# Patient Record
Sex: FEMALE
[De-identification: names, ages, dates, MRNs, and addresses within clinical notes are randomized per-mention and may not be internally consistent; named-entity substitution may affect disease eponyms.]

---

## 2018-01-12 ENCOUNTER — CHARTING TRANS (OUTPATIENT)
Dept: OTHER | Age: 28
End: 2018-01-12

## 2018-01-29 PROBLEM — IMO0002 CHRONIC MIGRAINE: Status: ACTIVE | Noted: 2018-01-29

## 2018-11-02 VITALS
RESPIRATION RATE: 16 BRPM | HEART RATE: 70 BPM | WEIGHT: 179.99 LBS | TEMPERATURE: 98 F | BODY MASS INDEX: 28.25 KG/M2 | HEIGHT: 67 IN

## 2018-11-14 ENCOUNTER — CHARTING TRANS (OUTPATIENT)
Dept: OTHER | Age: 28
End: 2018-11-14

## 2018-12-07 VITALS
HEIGHT: 67 IN | WEIGHT: 180 LBS | HEART RATE: 60 BPM | RESPIRATION RATE: 16 BRPM | TEMPERATURE: 97.8 F | OXYGEN SATURATION: 99 % | BODY MASS INDEX: 28.25 KG/M2 | SYSTOLIC BLOOD PRESSURE: 122 MMHG | DIASTOLIC BLOOD PRESSURE: 78 MMHG

## 2019-06-17 PROBLEM — F41.9 ANXIETY: Status: ACTIVE | Noted: 2019-06-17

## 2019-06-19 PROCEDURE — 81003 URINALYSIS AUTO W/O SCOPE: CPT | Performed by: FAMILY MEDICINE

## 2019-06-19 PROCEDURE — 86480 TB TEST CELL IMMUN MEASURE: CPT | Performed by: FAMILY MEDICINE

## 2019-08-22 PROBLEM — R63.5 WEIGHT GAIN: Status: ACTIVE | Noted: 2019-08-22

## 2023-12-11 ENCOUNTER — OFFICE VISIT (OUTPATIENT)
Dept: FAMILY MEDICINE CLINIC | Facility: CLINIC | Age: 33
End: 2023-12-11
Payer: COMMERCIAL

## 2023-12-11 VITALS
HEART RATE: 67 BPM | BODY MASS INDEX: 32.18 KG/M2 | SYSTOLIC BLOOD PRESSURE: 114 MMHG | OXYGEN SATURATION: 98 % | RESPIRATION RATE: 16 BRPM | TEMPERATURE: 98 F | WEIGHT: 205 LBS | HEIGHT: 67 IN | DIASTOLIC BLOOD PRESSURE: 76 MMHG

## 2023-12-11 DIAGNOSIS — J01.90 SUBACUTE SINUSITIS, UNSPECIFIED LOCATION: Primary | ICD-10-CM

## 2023-12-11 DIAGNOSIS — R05.8 COUGH PRESENT FOR GREATER THAN 3 WEEKS: ICD-10-CM

## 2023-12-11 RX ORDER — CLOBETASOL PROPIONATE 0.5 MG/G
1 OINTMENT TOPICAL 2 TIMES DAILY
COMMUNITY
Start: 2023-06-20

## 2023-12-11 RX ORDER — DOXYCYCLINE HYCLATE 100 MG/1
100 CAPSULE ORAL 2 TIMES DAILY
Qty: 14 CAPSULE | Refills: 0 | Status: SHIPPED | OUTPATIENT
Start: 2023-12-11 | End: 2023-12-18

## 2023-12-12 NOTE — PATIENT INSTRUCTIONS
PLAN: Doxycycline, take as directed. Finish all the medication even if you feel better. Sit upright for one hour after taking with full glass of water. Consider daily allergy medication such as Allegra, Claritin or Zyrtec for the next week or two to help remove post nasal drainage. Salt water gargles (1 tsp. Salt in 6 oz lukewarm water), use several times daily to help decrease post nasal drip and soothe throat. Saline nasal spray to nostrils if needed to help remove drainage or congestion in nose. Hydrate! (cold or hot based on comfort). Drink lots of water or other non dehydrating liquids to help with illness. Hand washing-use hand  or wash hands frequently, cover your cough or sneeze, do not share towels or drinks with others. Hot steam inhalation, humidifier in the bedroom for sleep. May use Tylenol or Ibuprofen over the counter for pain/comfort if not contraindicated. Follow up in 2 weeks with Dr. Erwin Holguin if not improving,  or sooner if worsening symptoms. Seek immediate care if inability to swallow or breathe.

## 2024-01-17 NOTE — DISCHARGE INSTRUCTIONS
Supportive Care measures   - Alternate ice / heat as tolerated   - Drink plenty of water (approx. 4-6 bottle equivalents per day)   - Naproxen, Ibuprofen, or Tylenol for pain as needed   - Zanaflex for spasms as needed (may make your drowsy)   - You may benefit from over-the-counter topical pain medication such as: Diclofenac (Voltaren), Icy/Hot, Biofreeze, Bengay, etc.   - You may benefit from massage, acupuncture, and/or dry needling   - Avoid excessive twisting / bending / lifting   - Avoid looking down (e.g. texting) as this worsens strain   - Use proper body posture (sitting up straight, shoulder back, etc.)   - You may benefit from using a posture corrector, massage cane, and/to soft cervical collar when at home

## 2024-01-17 NOTE — ED PROVIDER NOTES
History     Chief Complaint   Patient presents with    Neck Pain       Subjective:   HPI    Terra Knox, 33 year old female with notable medical history of chronic migraine, anxiety who presents with Right sided neck pain that radiates to her shoulder. Patient reports s/s stated approx 1.5 weeks ago w/o known injury or precipitating events. Patient did work up the day prior, but denies any events / movements that started her pain. She attempted a friend's Cyclobenzaprine which helped some, but was too sedating. Denies numbness / tingling.    Of note, patient has been seen within the last month a few times for migrainous and sinus complaints, with patient receiving Botox for migraines and Doxy for sinusitis.      Objective:   Past Medical History:   Diagnosis Date    Anxiety     Depression     Migraine               Past Surgical History:   Procedure Laterality Date    CHOLECYSTECTOMY  ' 09    OTHER SURGICAL HISTORY  ' 07    wisdom teeth                Social History     Socioeconomic History    Marital status:    Tobacco Use    Smoking status: Never    Smokeless tobacco: Never   Vaping Use    Vaping Use: Never used   Substance and Sexual Activity    Alcohol use: Yes     Alcohol/week: 0.0 standard drinks of alcohol     Comment: socially    Drug use: No              Review of Systems   Musculoskeletal:  Positive for back pain and neck pain.   All other systems reviewed and are negative.        Constitutional and vital signs reviewed.      All other systems reviewed and negative except as noted above.    I have reviewed the family history, social history, allergies, and outpatient medications.     History reviewed from EMR: Encounters, problem list, allergies, medications      Physical Exam     ED Triage Vitals [01/17/24 0819]   /80   Pulse 64   Resp 18   Temp 98 °F (36.7 °C)   Temp src Temporal   SpO2 100 %   O2 Device None (Room air)       Current:/80   Pulse 64   Temp 98 °F (36.7 °C)  (Temporal)   Resp 18   Ht 170.2 cm (5' 7\")   Wt 90.7 kg   LMP 01/16/2024 (Exact Date)   SpO2 100%   BMI 31.32 kg/m²       Physical Exam  Vitals and nursing note reviewed.   Constitutional:       General: She is not in acute distress.     Appearance: Normal appearance. She is normal weight. She is not ill-appearing or toxic-appearing.   HENT:      Head: Normocephalic and atraumatic.      Right Ear: External ear normal.      Left Ear: External ear normal.      Nose: Nose normal.      Mouth/Throat:      Mouth: Mucous membranes are moist.   Eyes:      Extraocular Movements: Extraocular movements intact.      Conjunctiva/sclera: Conjunctivae normal.      Pupils: Pupils are equal, round, and reactive to light.   Neck:      Comments: Tenderness to Right paraspinal musculature and spasms to Right upper trapezius.  +hunched, rolled shoulder posture  Cardiovascular:      Rate and Rhythm: Normal rate.      Pulses: Normal pulses.   Pulmonary:      Effort: Pulmonary effort is normal. No respiratory distress.   Musculoskeletal:         General: No swelling or signs of injury. Normal range of motion.      Cervical back: Full passive range of motion without pain, normal range of motion and neck supple. Spasms and tenderness present. No torticollis or bony tenderness. Muscular tenderness present. No spinous process tenderness.   Skin:     General: Skin is warm and dry.      Capillary Refill: Capillary refill takes less than 2 seconds.      Coloration: Skin is not jaundiced.   Neurological:      General: No focal deficit present.      Mental Status: She is alert and oriented to person, place, and time. Mental status is at baseline.   Psychiatric:         Mood and Affect: Mood normal.         Behavior: Behavior normal.         Thought Content: Thought content normal.         Judgment: Judgment normal.            ED Course     Labs Reviewed - No data to display  No orders to display       Vitals:    01/17/24 0819   BP: 120/80    Pulse: 64   Resp: 18   Temp: 98 °F (36.7 °C)   TempSrc: Temporal   SpO2: 100%   Weight: 90.7 kg   Height: 170.2 cm (5' 7\")            LYNN        Terra Knox, 33 year old female with medical history as noted above who presents with neck and upper back pain   - Patient in NAD, VSS   - HPI and exam c/w cervical strain and muscle spasms   - Mild massage performed with palpable release of some upper trapezius muscle spasms   - Supportive care discussed   - f/u with care team as needed        ** See ED course for additional information on care provided / interventions / notable events throughout patient's encounter.    ** See below for home care instructions (if applicable)         I have independently reviewed the radiology images, clinical lab results, and ECG tracings as described above (if applicable)        Medical Decision Making  Risk  Prescription drug management.        Disposition and Plan     Clinical Impression:  1. Strain of neck muscle, initial encounter    2. Paraspinal muscle spasm    3. Trapezius muscle spasm         Disposition:  Discharge  1/17/2024  8:34 am    Follow-up:  Douglas Jurado DO  [address] [place] [address] [phone]      As needed    [facility]  [address] [facility] [address] [phone]    Physical Therapy referral (if needed)          Medications Prescribed:  Discharge Medication List as of 1/17/2024  8:37 AM        START taking these medications    Details   naproxen 500 MG Oral Tab Take 1 tablet (500 mg total) by mouth 2 (two) times daily as needed., Normal, Disp-20 tablet, R-0      tiZANidine 4 MG Oral Tab Take 1 tablet (4 mg total) by mouth 2 (two) times daily as needed (spasms)., Normal, Disp-20 tablet, R-0             The above patient (and/or guardian) was made aware that an appropriate evaluation has been performed, and that no additional testing is required at this time. In my medical judgment, there is currently no evidence  of an immediate life-threatening or surgical condition, therefore discharge is indicated at this time. The patient (and/or guardian) was advised that a small risk still exists that a serious condition could develop. The patient was instructed to arrange close follow-up with their primary care provider (or the referral provider given today). The patient received written and verbal instructions regarding their condition / concerns, demonstrated understanding, and is agreement with the outpatient treatment plan.        Home care instructions:    Supportive Care measures   - Alternate ice / heat as tolerated   - Drink plenty of water (approx. 4-6 bottle equivalents per day)   - Naproxen, Ibuprofen, or Tylenol for pain as needed   - Zanaflex for spasms as needed (may make your drowsy)   - You may benefit from over-the-counter topical pain medication such as: Diclofenac (Voltaren), Icy/Hot, Biofreeze, Bengay, etc.   - You may benefit from massage, acupuncture, and/or dry needling   - Avoid excessive twisting / bending / lifting   - Avoid looking down (e.g. texting) as this worsens strain   - Use proper body posture (sitting up straight, shoulder back, etc.)   - You may benefit from using a posture corrector, massage cane, and/to soft cervical collar when at home      Shai Steven, DNP, APRN, AGACNP-BC, FNP-C, CNL  Adult-Gerontology Acute Care & Family Nurse Practitioner  [facility]

## 2024-01-17 NOTE — ED INITIAL ASSESSMENT (HPI)
Patient c/o right sided neck pain that goes to shoulder and clavicle area. Unknown injury. Pain started 1.5 weeks ago.

## 2024-05-13 NOTE — ED INITIAL ASSESSMENT (HPI)
Pt states bumped lt foot on the bed frame yesterday morning.   C/o lt 4th toe pain.  Bruising noted to top of foot.

## 2024-05-13 NOTE — ED PROVIDER NOTES
Patient Seen in: [facility]      History     Chief Complaint   Patient presents with    Foot Pain     Stated Complaint: Lt foot injury    Subjective:   HPI  33-year-old female presents complaining of left fourth toe pain, swelling, and bruising since she hit it on the bed frame.    Objective:   Past Medical History:    Anxiety    Depression    Migraine              Past Surgical History:   Procedure Laterality Date    Cholecystectomy  ' 09    Other surgical history  ' 07    wisdom teeth                Social History     Socioeconomic History    Marital status:    Tobacco Use    Smoking status: Never    Smokeless tobacco: Never   Vaping Use    Vaping status: Never Used   Substance and Sexual Activity    Alcohol use: Yes     Alcohol/week: 0.0 standard drinks of alcohol     Comment: socially    Drug use: No              Review of Systems   All other systems reviewed and are negative.      Positive for stated complaint: Lt foot injury  Other systems are as noted in HPI.  Constitutional and vital signs reviewed.      All other systems reviewed and negative except as noted above.    Physical Exam     ED Triage Vitals [05/13/24 1811]   /72   Pulse 69   Resp 18   Temp 97.1 °F (36.2 °C)   Temp src Temporal   SpO2 99 %   O2 Device None (Room air)       Current Vitals:   Vital Signs  BP: 135/72  Pulse: 69  Resp: 18  Temp: 97.1 °F (36.2 °C)  Temp src: Temporal    Oxygen Therapy  SpO2: 99 %  O2 Device: None (Room air)            Physical Exam  Vitals and nursing note reviewed.   Constitutional:       Appearance: She is well-developed.   Cardiovascular:      Rate and Rhythm: Normal rate.   Pulmonary:      Effort: Pulmonary effort is normal.   Musculoskeletal:      Comments: Left fourth toe with swelling, bruising, and tenderness with limited range of motion.  No metatarsal tenderness.  Pulses intact.   Skin:     General: Skin is warm and dry.   Neurological:      Mental Status: She is alert and  oriented to person, place, and time.               ED Course   Labs Reviewed - No data to display          XR FOOT, COMPLETE (MIN 3 VIEWS), LEFT (CPT=73630)    Result Date: 5/13/2024  PROCEDURE:  XR FOOT, COMPLETE (MIN 3 VIEWS), LEFT (CPT=73630)  TECHNIQUE:  AP, oblique, and lateral views were obtained.  COMPARISON:  None.  INDICATIONS:  Lt foot injury  PATIENT STATED HISTORY: (As transcribed by Technologist)  Patient states injury to left foot after smashing it on bed frame, complains of pain, swelling and bruising to the 4th toe and the area around it.    FINDINGS:  BONES:  Mildly angulated fracture proximal phalange 4th toe is noted. SOFT TISSUES:  Negative.  No visible soft tissue swelling. EFFUSION:  None visible. OTHER:  Negative.            CONCLUSION:  Mildly angulated oblique fracture proximal phalange 4th toe.   LOCATION:  Replaced by [facility]   Dictated by (CST): Sheldon Hylton MD on 5/13/2024 at 7:00 PM     Finalized by (CST): Sheldon Hylton MD on 5/13/2024 at 7:01 PM               MDM     Medical Decision Making  33-year-old female presents complaining of left fourth toe pain, swelling, and bruising since she hit it on the bed frame.    Clinical impression: Closed nondisplaced fracture proximal phalanx of fourth toe    Differential diagnosis: Toe fracture, toe sprain, foot fracture    X-ray shows Mildly angulated oblique fracture proximal phalange 4th toe.  Buddy tape applied to third and fourth toe and patient given a postop shoe with follow-up with podiatry.  I discussed ice, elevation, and Tylenol/Motrin as needed for pain.    Problems Addressed:  Closed nondisplaced fracture of proximal phalanx of lesser toe of left foot, initial encounter: acute illness or injury    Amount and/or Complexity of Data Reviewed  Radiology: ordered and independent interpretation performed.     Details: Fracture proximal phalanx of left fourth toe        Disposition and Plan     Clinical Impression:  1. Closed nondisplaced fracture of  proximal phalanx of lesser toe of left foot, initial encounter         Disposition:  Discharge  5/13/2024  7:15 pm    Follow-up:  Lei Wagner DPM  [address]  [facility] [address] [phone]    Call             Medications Prescribed:  Current Discharge Medication List

## 2024-05-14 NOTE — DISCHARGE INSTRUCTIONS
Use buddy tape and postop shoe until follow-up with orthopedic.  Ice and elevate.  Tylenol and Motrin as needed for pain.

## 2024-07-17 NOTE — H&P
Subjective:   Terra Knox is a 33 year old female  who presents for [facility]     New pt.   She reports being concerned about hormones.   She follows with gyne. She has been trying to conceive for the past year.   Reports that menses is regular; ~23-27 days.   On prenatal vitamins.     Reports difficulty losing weight. Obesity- per charge review she was previously referred to weight loss clinic by another provider.   She also had insulin, tsh and leptin checked in 5/2023 that were unremarkable.   Reports exercising and trying to eat healthy     Migraines- follows with neuro.    Anxiety- controlled. Following with therapy 2x/month     Denies immediate family hx of breast or colon cancer.  Aunt with breast cancer. Per pt not BRCA positive       History/Other:    Chief Complaint Reviewed and Verified  No Further Nursing Notes to   Review  Tobacco Reviewed  Allergies Reviewed  Medications Reviewed    Problem List Reviewed  Medical History Reviewed  Surgical History   Reviewed  OB Status Reviewed  Family History Reviewed  Social History   Reviewed         Current Outpatient Medications   Medication Sig Dispense Refill    clobetasol 0.05 % External Ointment Apply 1 Application topically in the morning and 1 Application before bedtime. Pt stated she takes as needed..      butalbital-acetaminophen-caffeine -40 MG Oral Tab Take 1 tablet by mouth every 6 (six) hours as needed for Headaches. 30 tablet 0       Review of Systems:  Pertinent items are noted in HPI.  A comprehensive 10 point review of systems was completed.  Pertinent positives and negatives noted in the the HPI.        Objective:   /60 (BP Location: Right arm, Patient Position: Sitting, Cuff Size: adult)   Pulse 80   Temp 97.8 °F (36.6 °C) (Temporal)   Resp 16   Ht 5' 7\" (1.702 m)   Wt 214 lb 3.2 oz (97.2 kg)   LMP 07/16/2024 (Exact Date)   SpO2 97%   BMI 33.55 kg/m²  Estimated body mass index is 33.55 kg/m² as calculated  from the following:    Height as of this encounter: 5' 7\" (1.702 m).    Weight as of this encounter: 214 lb 3.2 oz (97.2 kg).  PHYSICAL EXAM:   General: no acute distress   Neck: trachea midline; no adenopathy; thyroid not enlarged   Hematologic/lymphatic:no cervical lymphadenopathy; no supraclavicular adenopathy   Respiratory: no increased work of breathing; good air exchange; CTAB; no crackles or wheezing   Cardiovascular: RRR; S1, S2; no murmurs;  Gastrointestinal: normal bowel sounds; soft; non-distended; non-tender  Neurological: awake, alert, oriented x3; CNII-XII grossly intact  Behavioral/Psych: euthymic; appropriate affect   Skin-thinning of hair but pt reports no acute hair loss     Assessment & Plan:     Terra was seen today for establish care.    Diagnoses and all orders for this visit:    Class 1 obesity with body mass index (BMI) of 33.0 to 33.9 in adult, unspecified obesity type, unspecified whether serious comorbidity present  -     Vitamin D; Future  -     CBC With Differential With Platelet; Future  -     Comp Metabolic Panel (14); Future  -     Hemoglobin A1C; Future  -     Lipid Panel; Future  -     Cortisol [E]; Future  -     Testosterone,Free And Total (Female/Child) [E]; Future  -     TSH and Free T4 [E]; Future  -     Dehydroepiandrosterone Sulfate [E]; Future  -     Iron And Tibc; Future  -     B12 AND FOLATE; Future  -diet/exercise     Female fertility problems  -     Vitamin D; Future  -     CBC With Differential With Platelet; Future  -     Comp Metabolic Panel (14); Future  -     Hemoglobin A1C; Future  -     Lipid Panel; Future  -     Cortisol [E]; Future  -     Testosterone,Free And Total (Female/Child) [E]; Future  -     TSH and Free T4 [E]; Future  -     Dehydroepiandrosterone Sulfate [E]; Future  -     Iron And Tibc; Future  -     B12 AND FOLATE; Future  -follow-up with ob/gyne     Other migraine without status migrainosus, not intractable  -controlled. As per  neuro          Carlene Hernández MD

## 2024-09-12 NOTE — PROGRESS NOTES
New Patient Evaluation - History and Physical    CONSULT - Reason for Visit:    low testosterone  Requesting Physician:   ..Carlene Hernández MD    CHIEF COMPLAINT:    Chief Complaint   Patient presents with    Consult     Pt is here for consult for blood test/Testerone levels. Pt was referred by pcp Dr Wade pt has not been able to loose weight and also trying to conceive.         HISTORY OF PRESENT ILLNESS:   Terra Knox is a 34 year old female who presents with  low testosterone, Psoriasis  and infertility   Pt and her  are trying to conceive for a  year  will get tested soon   She cannot lose wt   LMP 9/6/2024 . periods regular    We reviewed her calendar and she ovulated last month.   We discussed her w/u   High progesterone and low testosterone. Normal thyroid and prolactin. TG is high but not concerning. We discussed lifestyle changes.      8/29/24  9:34 AM    Progesterone  ng/mL 5.49      Lab Results   Component Value Date    A1C 5.2 07/22/2024 9/8/24 10:32 AM    TSH  0.270 - 4.200 mIU/L 1.430       ASSESSMENT AND PLAN:  Terra Knox is a 34 year old female who presents with    low testosterone, Psoriasis  and infertility     Plan  Labs now   RTC if labs are abnormal   High Tg and obese so will benefit from wt loss  now   Target is wt loss 5-10% of body wt   We discussed lifestyle changes vs meds   Will think about RD consult   PAST MEDICAL HISTORY:   Past Medical History:    Anxiety    Depression    Migraine   Psoriasis on steroid cream     PAST SURGICAL HISTORY:   Past Surgical History:   Procedure Laterality Date    Cholecystectomy  ' 09    Other surgical history  ' 07    wisdom teeth       CURRENT MEDICATIONS:     clobetasol 0.05 % External Ointment Apply 1 Application topically in the morning and 1 Application before bedtime. Pt stated she takes as needed..      butalbital-acetaminophen-caffeine -40 MG Oral Tab Take 1 tablet by mouth every 6 (six) hours as needed for  Headaches. 30 tablet 0       ALLERGIES:  Allergies   Allergen Reactions    Phentermine INSOMNIA     Could not sleep and also had increased emotionality    Amoxicillin OTHER (SEE COMMENTS)     Pt stated that this medication gives her yeast infections and she would rather not get this medication.       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    Diabetes Maternal Grandfather         type 2   Cousin with celiac      REVIEW OF SYSTEMS:  All negative other than HPI    PHYSICAL EXAM:   Height: 5' 7\" (170.2 cm) (09/12 0928)  Weight: 216 lb (98 kg) (09/12 0928)  BSA (Calculated - sq m): 2.09 sq meters (09/12 0928)  Pulse: --  BP: 118/70 (09/12 0928)  Temp: --  Do Not Use - Resp Rate: --  SpO2: --    Body mass index is 33.83 kg/m².    CONSTITUTIONAL:  Awake and alert. Age appropriate, good hygiene not in acute distress. Well-nourished and well developed. no acute distress   PSYCH:   Orientated to time, place, person & situation, Normal mood and affect, memory intact, normal insight and judgment, cooperative  Neuro: speech is clear. Awake, alert, no aphasia, no facial asymmetry, no nuchal rigidity  EYES:  No proptosis, no ptosis, conjunctiva normal  ENT:  Normocephalic, atraumatic  Eye: EOMI, normal  lids, no discharge, no conjunctival erythema. No exophthalmos/proptosis, Ptosis negative   No rhinorrhea, moist oral mucosa  Neck: full range of motion  Neck/Thyroid: neck inspection: normal, No scar, No goiter   LUNGS:  No acute respiratory distress, non-labored respiration. Speaking full sentences  CARDIOVASCULAR:  regular rate   ABDOMEN:  No abdominal pain.   SKIN:  no bruising or bleeding, no rashes and no lesions, Skin is dry, no obvious rashes or lesions  EXTREMITIES: no gross abnormality   MSK: Moves extremities spontaneously. full range of motion in all major joints      DATA:     Pertinent data reviewed     5/2024 XRAY    CONCLUSION:  Mildly angulated oblique fracture proximal phalange 4th toe.      Latest Reference Range & Units 07/22/24 08:03   Sex Horm Bind Glob 24.6 - 122.0 nmol/L 23.9 (L)   Testost % Free+Weak Bnd 3.0 - 18.0 % 23.3 (H)   Testost Free+Weak Bnd 0.0 - 9.5 ng/dL 2.3   Testosterone Tot LC/MS 10.0 - 55.0 ng/dL 9.7 (L)          Latest Reference Range & Units 07/22/24 08:03   T4,Free (Direct) 0.8 - 1.7 ng/dL 1.0   TSH 0.550 - 4.780 mIU/mL 1.784   Cortisol ug/dL 16.9     No results for input(s): \"TSH\", \"T4F\", \"T3F\", \"THYP\" in the last 72 hours.  No results found.    Orders Placed This Encounter   Procedures    Thyroid Peroxidase (TPO) AB    Celiac Disease Screen Reflex     Orders Placed This Encounter    Thyroid Peroxidase (TPO) AB     Order Specific Question:   Release to patient     Answer:   Immediate    Celiac Disease Screen Reflex     Standing Status:   Future     Standing Expiration Date:   9/12/2025     Order Specific Question:   Release to patient     Answer:   Immediate          This is a specialized patient consultation in endocrinology and required comprehensive review of prior records, as well as current evaluation, with time required for consideration of complex endocrine issues and consultation. For this visit, I personally interviewed the patient, and family member if  accompanied, performed the pertinent parts of the history and physical examination. ROS included screening for appropriate endocrine conditions.   Today's diagnosis and plan were reviewed in detail with the patient who states understanding and agrees with plan. I discussed with the patient possible diagnosis, differential diagnosis, need for work up, treatment options, alternatives and side effects.     Please see note for details about time spent which includes:   · pre-visit preparation  · reviewing records  · face to face time with the patient   · timely documentation of the encounter  · ordering medications/tests  · communication with care team  · care coordination    I appreciate the opportunity to be part of your patient's medical care and will keep you, as the referring and primary physicians, informed about the care of your patient. Please feel free to contact me should you have any questions.    The 21st Century Cures Act makes medical notes like these available to patients in the interest of transparency. Please be advised this is a medical document. Medical documents are intended to carry relevant information, facts as evident, and the clinical opinion of the practitioner. The medical note is intended as peer to peer communication and may appear blunt or direct. It is written in medical language and may contain abbreviations or verbiage that are unfamiliar.   Pam Patel MD

## 2024-09-12 NOTE — PATIENT INSTRUCTIONS
Labs now   RTC if labs are abnormal   High Tg and obese so will benefit from wt loss  now   Target is wt loss 5-10% of body wt   We discussed lifestyle changes vs meds   Will think about RD consult